# Patient Record
(demographics unavailable — no encounter records)

---

## 2017-03-14 NOTE — EKG REPORT
SEVERITY:- ABNORMAL ECG -

SINUS RHYTHM

CONSIDER LEFT VENTRICULAR HYPERTROPHY

:

Confirmed by: Brigida Zhang MD 14-Mar-2017 17:12:53

## 2019-02-13 NOTE — RADIOLOGY REPORT (SQ)
EXAM DESCRIPTION:  ANKLE RIGHT AP/LATERAL



COMPLETED DATE/TIME:  2/13/2019 3:35 pm



REASON FOR STUDY:  RT ANKLE SWELLING; ANKYLOSIS RT ANKLE M24.671  ANKYLOSIS, RIGHT ANKLE



COMPARISON:  None.



NUMBER OF VIEWS:  Two views.



TECHNIQUE:  AP and lateral radiographic images acquired of the right ankle.



LIMITATIONS:  None.



FINDINGS:  MINERALIZATION: Normal.

BONES: No acute fracture or dislocation.  No worrisome bone lesions.

JOINTS: There is a small joint effusion.

SOFT TISSUES: There is soft tissue swelling.

OTHER: No other significant finding.



IMPRESSION:  Small joint effusion.  Soft tissue swelling.  No fracture.



TECHNICAL DOCUMENTATION:  JOB ID:  [id number]

 2011 [employer]- All Rights Reserved



Reading location - IP/workstation name: [email or web address]

## 2019-04-04 NOTE — RADIOLOGY REPORT (SQ)
EXAM DESCRIPTION:  KNEE RIGHT 2 VIEWS



COMPLETED DATE/TIME:  4/4/2019 11:39 am



REASON FOR STUDY:  TRAUMATIC ARTHROPATHY, RIGHT KNEE Z00.00  ENCNTR FOR GENERAL ADULT MEDICAL EXAM W/
O ABNORMAL FI M12.561  TRAUMATIC ARTHROPATHY, RIGHT KNEE



COMPARISON:  None.



NUMBER OF VIEWS:  Two views.



TECHNIQUE:  AP and lateral radiographic images acquired of the right knee.



LIMITATIONS:  None.



FINDINGS:  MINERALIZATION: Normal.

BONES: No acute fracture or dislocation.  No worrisome bone lesions.

JOINT: No effusion.

SOFT TISSUES: No soft tissue swelling.  No radio-opaque foreign body.

OTHER: No other significant finding.



IMPRESSION:  NEGATIVE STUDY OF THE RIGHT KNEE. NO RADIOGRAPHIC EVIDENCE OF ACUTE INJURY.



TECHNICAL DOCUMENTATION:  JOB ID:  [id number]

 2011 [employer]- All Rights Reserved



Reading location - IP/workstation name: [email or web address]

## 2019-12-10 NOTE — ER DOCUMENT REPORT
ED Medical Screen (RME)





- General


Chief Complaint: Slurred Speech


Stated Complaint: SLURRED SPEECH


Time Seen by Provider: 12/10/19 16:23


Primary Care Provider: 


MARCO GRIMES [Primary Care Provider] - Follow up as needed


Mode of Arrival: Ambulatory


Information source: Patient


Notes: 





58-year-old female presented to ED for complaint of strokelike symptoms at her 

primary care's office.  She states she has been having these symptoms since 

Friday.  She states she has had some feeling like her tongue was swollen some 

slurring of her speech drooping of her face and she states she keeps falling 

since Friday.  She states she has no new symptoms since Friday.  She states she 

attempted to give plasma today so they took her off of it.  She then went to her

primary care doctor who noticed her symptoms and sent her to the emergency room.

 Patient is alert oriented she does have a little bit of slurring to her speech.

 Patient does have some droop to the left side of her face there is no palmar 

drift.  She states she has had some weakness to the left leg.  She does have 

some weight gain in the past month.














I have greeted and performed a rapid initial assessment of this patient.  A 

comprehensive ED assessment and evaluation of the patient, analysis of test 

results and completion of medical decision making process will be conducted by 

an additional ED providers.


TRAVEL OUTSIDE OF THE [place] IN LAST 30 DAYS: No





- Related Data


Allergies/Adverse Reactions: 


                                        





acetaminophen [From Percocet] Allergy (Verified 03/10/13 11:56)


   


ibuprofen [Ibuprofen] Allergy (Verified 03/10/13 11:56)


   


morphine [Morphine] Allergy (Verified 03/10/13 11:56)


   


oxycodone HCl [From Percocet] Allergy (Verified 03/10/13 11:56)


   


prochlorperazine edisylate [From Compazine] Allergy (Verified 03/10/13 11:55)


   


prochlorperazine maleate [From Compazine] Allergy (Verified 03/10/13 11:55)


   











Past Medical History





- Past Medical History


Cardiac Medical History: Reports: Hx Hypertension





- Immunizations


Immunizations up to date: Yes


Hx Diphtheria, Pertussis, Tetanus Vaccination: Yes





Doctor's Discharge





- Discharge


Referrals: 


MARCO GRIMES [Primary Care Provider] - Follow up as needed

## 2019-12-10 NOTE — RADIOLOGY REPORT (SQ)
EXAM DESCRIPTION:  CT HEAD WITHOUT



COMPLETED DATE/TIME:  12/10/2019 6:38 pm



REASON FOR STUDY:  Slurred speech, left weakness



COMPARISON:  CT brain 5/7/2010



TECHNIQUE:  Axial images acquired through the brain without intravenous contrast.  Images reviewed wi
th bone, brain and subdural windows.  Additional sagittal and coronal reconstructions were generated.
 Images stored on PACS.

All CT scanners at this facility use dose modulation, iterative reconstruction, and/or weight based d
osing when appropriate to reduce radiation dose to as low as reasonably achievable (ALARA).

[facility]: Dose Right  [facility]: CareDose    [facility]: Dose Right    [facility]: Teradose 4D    [facility]: [employer] Technologies



RADIATION DOSE:  CT Rad equipment meets quality standard of care and radiation dose reduction techniq
ues were employed. CTDIvol: 53.2 mGy. DLP: 991 mGy-cm. mGy.



LIMITATIONS:  None.



FINDINGS:  VENTRICLES: Normal size and contour.

CEREBRUM: There is focal low attenuation along the right posterior limb internal capsule/medial basal
 ganglia on axial images 16-19 and coronal image 32/60.  This could represent an early subacute infar
ct 1 week to 1-month-old.  No CT evidence of acute intracranial hemorrhage, mass effect, or midline s
hift.

CEREBELLUM: No masses.  No hemorrhage.  No alteration of density.  No evidence for acute infarction.

EXTRAAXIAL SPACES: No fluid collections.  No masses.

ORBITS AND GLOBE: No intra- or extraconal masses.  Normal contour of globe without masses.

CALVARIUM: No fracture.

PARANASAL SINUSES: No fluid or mucosal thickening.

SOFT TISSUES: No mass or hematoma.

OTHER: No other significant finding.



IMPRESSION:  Early subacute nonhemorrhagic infarct in the right posterior limb internal capsule/later
al basal ganglia.

EVIDENCE OF ACUTE STROKE: Yes



COMMENT:  Quality ID # [id number]: Final reports with documentation of one or more dose reduction techniques
 (e.g., Automated exposure control, adjustment of the mA and/or kV according to patient size, use of 
iterative reconstruction technique)



TECHNICAL DOCUMENTATION:  JOB ID:  [id number]

 2011 [employer]- All Rights Reserved



Reading location - IP/workstation name: [facility]

## 2019-12-10 NOTE — ER DOCUMENT REPORT
ED Neuro Symptoms/Deficit





- General


Chief Complaint: Slurred Speech


Stated Complaint: SLURRED SPEECH


Time Seen by Provider: 12/10/19 16:23


Primary Care Provider: 


[facility] [Primary Care Provider] - Follow up as needed


Mode of Arrival: Ambulatory


Information source: Patient, Relative


Notes: 





Hx. HTN. No DM. Non-smoker. No CAD. FHx. negative for CVA. Not taking ASA or 

Statin.


TRAVEL OUTSIDE OF THE [place] IN LAST 30 DAYS: No





- HPI


Patient complains to provider of: Difficulty walking, Facial Droop, Speech 

Impairment.  No: Falling, Paresthesia, Vision Changes


Onset: Other - 4 days ago


Awoke with symptoms: No


Exact time of onset: 8 PM Friday


Symptoms are: Constant


Duration: Continues in ED, More than 3 hrs


Quality of pain: No pain


Loss of consciousness: No loss of consciousness


Was STROKE ALERT Called: No


Baseline Cognitive: Alert, oriented X 3


Baseline Gait: Walks w/o assistance


New weakness: LLE, L facial


Decreased ability to stand/walk: Difficult


Impaired speech/swallowing: Difficult - minimal slurring and tongue feels 

swollen. Eating/drinking normally.


Vision problem/glaucoma: No


Associated symptoms: denies: Chest pain, Headache, Seizure


Similar symptoms previously: No


Recently seen / treated by doctor: Yes - sent to ED by [facility] 

provider





- Related Data


Allergies/Adverse Reactions: 


                                        





acetaminophen [From Percocet] Allergy (Verified 03/10/13 11:56)


   


ibuprofen [Ibuprofen] Allergy (Verified 03/10/13 11:56)


   


morphine [Morphine] Allergy (Verified 03/10/13 11:56)


   


oxycodone HCl [From Percocet] Allergy (Verified 03/10/13 11:56)


   


prochlorperazine edisylate [From Compazine] Allergy (Verified 03/10/13 11:55)


   


prochlorperazine maleate [From Compazine] Allergy (Verified 03/10/13 11:55)


   











Past Medical History





- General


Information source: Patient - On Amlodipine





- Social History


Smoking Status: Never Smoker


Frequency of alcohol use: None


Drug Abuse: None


Family History: Reviewed & Not Pertinent


Patient has suicidal ideation: No


Patient has homicidal ideation: No





- Past Medical History


Cardiac Medical History: Reports: Hx Hypertension





- Immunizations


Immunizations up to date: Yes


Hx Diphtheria, Pertussis, Tetanus Vaccination: Yes





Review of Systems





- Review of Systems


Notes: 





Constitutional: Negative for fever.


HENT: Negative for sore throat.


Eyes: Negative for visual changes.


Cardiovascular: Negative for chest pain.


Respiratory: Negative for shortness of breath.


Gastrointestinal: Negative for abdominal pain, vomiting or diarrhea.


Genitourinary: Negative for dysuria.


Musculoskeletal: Negative for back pain.


Skin: Negative for rash.


Neurological: As per HPI.





10 point ROS negative except as marked above and in HPI.








Physical Exam





- Vital signs


Vitals: 


                                        











Temp Pulse Resp BP Pulse Ox


 


 98.3 F   85   16   154/94 H  100 


 


 12/10/19 16:26  12/10/19 16:26  12/10/19 16:26  12/10/19 16:26  12/10/19 16:26














- Notes


Notes: 











GENERAL: Well-developed well-nourished appearing in no acute distress.





SKIN: Good turgor no rashes.





HEAD: Normocephalic atraumatic.





EYES: PERRLA.  Conjunctivae and sclerae clear.





EARS: CANALS AND TMS CLEAR.





NOSE: CLEAR.





MOUTH: Speech is minimally slurred.  Normal gag reflex.  Patient was eating and 

drinking when I entered the room and was having no difficulty with this.  Moist 

mucosa.  Good dentition.  No stridor or edema.  No drooling.





NECK: Supple.  No masses or thyromegaly.  No adenopathy.  Carotids 2+ without 

bruits.  No JVD.





Throat, clear.





BACK: Symmetrical without tenderness.





CHEST: Respirations unlabored.  Breath sounds clear and symmetrical.





HEART: Regular rhythm.  No murmur gallop or rub.





ABDOMEN: Soft nontender without masses, organomegaly or rebound.  Bowel sounds 

normally active.  No bruits.





GENITALIA: Deferred.





EXTREMITIES: No edema.  No calf tenderness.  Cap refill less than 1.5 seconds.  

Dorsalis pedis and posterior tibial pulses 3+ and symmetrical.





NEUROLOGICAL: GCS 15.  Alert and oriented x3.  Dragging left lower extremity 

when she tries to walk but is able to stand.  Speech is fluent but mildly slu

rred..  Minimal left facial ptosis involving lower face only with otherwise 

normal cranial nerves II through XII intact.  Sensation normal throughout.  

Motor strength is 4/5 left lower extremity with 5/5 in the other 3 extremities. 

Normal tone.





Course





- Re-evaluation


Re-evalutation: 





12/10/19 19:56


Patient has findings on CT scan consistent with a subacute infarct in the 

posterior limb of the right internal capsule.  There is no associated 

hemorrhage.  This appears to be subacute per radiologist and that is consistent 

with clinical history.





Patient is not a candidate for lytic or interventional therapy based on 

timeframe.





Findings are discussed with on-call hospitalist Dr. Weiland who will admit to 

the floor.  Findings and recommendations have been discussed with patient and 

her boyfriend at the bedside and they expressed understanding and agreement with

treatment recommendations..





- Vital Signs


Vital signs: 


                                        











Temp Pulse Resp BP Pulse Ox


 


 98.3 F   85   20   131/68 H  100 


 


 12/10/19 16:27  12/10/19 16:26  12/10/19 19:21  12/10/19 19:21  12/10/19 19:21














- Laboratory


Result Diagrams: 


                                 12/10/19 17:02





                                 12/10/19 17:02


Laboratory results interpreted by me: 


                                        











  12/10/19 12/10/19 12/10/19





  17:02 17:02 17:04


 


RDW  15.1 H  


 


Potassium   3.5 L 


 


Est GFR (MDRD) Non-Af   50 L 


 


Urine Blood    SMALL H














- Diagnostic Test


Radiology reviewed: Reports reviewed





Discharge





- Discharge


Clinical Impression: 


 Subacute ischemic CVA





Condition: Fair


Disposition: ADMITTED AS INPATIENT


Admitting Provider: Weiland (Hospitalist)


Unit Admitted: Medical Floor


Referrals: 


[facility] [[facility]] - Follow up as needed

## 2019-12-11 NOTE — LEFT AGAINST MEDICAL ADVICE
Against Medical Advice


Admission Date/Time: 12/10/19 20:08





 Primary Care Provider: [facility]








Date of Patient Emigration: 12/11/19





- Diagnosis:


(1) Dysarthria


Is this a current diagnosis for this admission?: Yes   





(2) Weakness of right lower extremity


Is this a current diagnosis for this admission?: Yes   





(3) Weakness on right side of face


Is this a current diagnosis for this admission?: Yes   





(4) Hypokalemia


Is this a current diagnosis for this admission?: Yes   





(5) Hypertension


Is this a current diagnosis for this admission?: Yes   





- Summary:


Summary: 


Please see Admission and Progress Notes as well.





ANGEL SCHMIDT is a 58 F, who LEFT AGAINST MEDICAL ADVICE.





Patient chose to leave AGAINST MEDICAL ADVICE due to being nonavailability of a 

private room.





The Patient was admitted on 12/10/19 20:08.

## 2019-12-11 NOTE — EKG REPORT
SEVERITY:- ABNORMAL ECG -

SINUS RHYTHM

PROBABLE LEFT ATRIAL ABNORMALITY

PROBABLE LVH WITH SECONDARY REPOL ABNRM

PROLONGED QT INTERVAL

:

Confirmed by: Nav Butt MD 11-Dec-2019 07:40:04

## 2019-12-11 NOTE — PDOC H&P
History of Present Illness


Admission Date/PCP: 


12/10/19 20:08


[facility]


Patient complains of: Left-sided weakness


History of Present Illness: 


ANGEL SCHMIDT is a 58 year old female who presents the emergency room with a 5-

day history of left-sided weakness.  She admits to the sudden onset of mild 

weakness in her left lower extremity associated with mild drooping of the left 

side of her face as well as minimal slurring of her speech "I feel like my 

tongue is thick", beginning 5 days ago and persisting since that time.  She 

denies other associated or accompanying signs and symptoms.  She denies prior 

similar episodes.  She has not identified any aggravating or ameliorating f

actors for her left-sided weakness.  In the emergency room she was found to have

an ischemic stroke involving the posterior horn of the right internal capsule.  

She was subsequently admitted to the hospital for further evaluation and 

treatment.





Past Medical History


Cardiac Medical History: Reports: Hypertension


   Denies: Coronary Artery Disease, Myocardial Infarction, Hyperlipidema


Pulmonary Medical History: 


   Denies: Asthma, Chronic Obstructive Pulmonary Disease (COPD)


EENT Medical History: 


   Denies: Cataracts, Ears - Hearing aids


Neurological Medical History: 


   Denies: Hemorrhagic CVA, Ischemic CVA, Seizures


Endocrine Medical History: 


   Denies: Diabetes Mellitus Type 1, Diabetes Mellitus Type 2, Hyperthyroidism, 

Hypothyroidism, Obesity


Renal/ Medical History: 


   Denies: Chronic Kidney Disease, Nephrolithiasis


Malignancy Medical History: Reports: None


GI Medical History: 


   Denies: Cirrhosis, Crohn's Disease, Hepatitis, Ulcerative Colitis


Musculoskeltal Medical History: 


   Denies: Arthritis, Gout


Skin Medical History: 


   Denies: Eczema, Psoriasis


Psychiatric Medical History: 


   Denies: Alcohol Dependency, Substance Abuse, Tobacco Dependency


Traumatic Medical History: Reports: None


Hematology: 


   Denies: Anemia, Bleeding Tendencies


Infectious Medical History: Reports: None





Past Surgical History


Past Surgical History: Reports: None





Social History


Information Source: Patient


Lives with: Alone


Smoking Status: Never Smoker


Electronic Cigarette use?: No


Frequency of Alcohol Use: None


Hx Recreational Drug Use: No


Drugs: None


Hx Prescription Drug Abuse: No





- Advance Directive


Resuscitation Status: Full Code


Surrogate healthcare decision maker:: 


Efe Frye





Family History


Family History: Hypertension.  denies: CAD, CVA, DM, Malignancy


Parental Family History Reviewed: Yes


Children Family History Reviewed: No


Sibling(s) Family History Reviewed.: Yes





Medication/Allergy


Home Medications: 








Amlodipine Besylate [Norvasc 5 mg Tablet] 5 mg PO DAILY 12/10/19 


Cyclobenzaprine HCl [Flexeril 10 mg Tablet] 10 mg PO Q8HP PRN 12/10/19 


Penicillin V Potassium [Penicillin Vk 500 mg Tablet] 250 mg PO QIDP PRN 12/10/19




Tramadol HCl [Ultram 50 mg Tablet] 50 mg PO Q6HP PRN 12/10/19 








Allergies/Adverse Reactions: 


                                        





acetaminophen [From Percocet] Allergy (Verified 03/10/13 11:56)


   


ibuprofen [Ibuprofen] Allergy (Verified 03/10/13 11:56)


   


morphine [Morphine] Allergy (Verified 03/10/13 11:56)


   


oxycodone HCl [From Percocet] Allergy (Verified 03/10/13 11:56)


   


prochlorperazine edisylate [From Compazine] Allergy (Verified 03/10/13 11:55)


   


prochlorperazine maleate [From Compazine] Allergy (Verified 03/10/13 11:55)


   











Review of Systems


Constitutional: PRESENT: as per HPI, weakness - Left lower extremity and left 

facial weakness.  ABSENT: chills, fever(s), headache(s)


Eyes: ABSENT: visual disturbances, other - Eye pain


Ears: ABSENT: hearing changes, other - Ear pain


Nose, Mouth, and Throat: ABSENT: headache(s), mouth pain, sore throat


Cardiovascular: ABSENT: chest pain, palpitations


Respiratory: ABSENT: cough, dyspnea


Gastrointestinal: ABSENT: abdominal pain, constipation, diarrhea, nausea, 

vomiting


Musculoskeletal: PRESENT: muscle weakness - Left lower extremity and left facial

weakness.  ABSENT: back pain, joint swelling


Integumentary: ABSENT: pruritus, rash


Neurological: PRESENT: abnormal speech - Mild slurring of speech, focal weakness

- Left lower extremity and left facial weakness.  ABSENT: abnormal gait, 

abnormal movements, confusion, convulsions, lack of coordination, memory loss, 

syncope


Psychiatric: ABSENT: anxiety, depression


Endocrine: ABSENT: cold intolerance, heat intolerance


Hematologic/Lymphatic: ABSENT: easy bleeding, easy bruising


Allergic/Immunologic: ABSENT: seasonal rhinorrhea





Physical Exam


Vital Signs: 


                                        











Temp Pulse Resp BP Pulse Ox


 


 98.3 F   85   20   131/68 H  100 


 


 12/10/19 16:27  12/10/19 16:26  12/10/19 19:21  12/10/19 19:21  12/10/19 19:21








                                 Intake & Output











 12/08/19 12/09/19 12/10/19





 23:59 23:59 23:59


 


Weight   63.9 kg











General appearance: PRESENT: no acute distress, cooperative


Head exam: PRESENT: atraumatic, normocephalic


Eye exam: PRESENT: conjunctiva pink, EOMI.  ABSENT: conjunctival injection, 

nystagmus, scleral icterus


Ear exam: PRESENT: normal external ear exam.  ABSENT: bleeding, drainage


Mouth exam: PRESENT: dry mucosa, neck supple


Neck exam: ABSENT: thyromegaly, tracheal deviation


Respiratory exam: PRESENT: clear to auscultation norberto, symmetrical, unlabored


Cardiovascular exam: PRESENT: RRR.  ABSENT: clicks, gallop, rubs


Pulses: PRESENT: normal radial pulses, normal dorsalis pedis pul


Vascular exam: PRESENT: normal capillary refill.  ABSENT: pallor


GI/Abdominal exam: PRESENT: normal bowel sounds, soft.  ABSENT: tenderness


Rectal exam: PRESENT: deferred


Extremities exam: ABSENT: joint swelling, pedal edema


Musculoskeletal exam: ABSENT: deformity, dislocation


Neurological exam: PRESENT: alert, oriented to person, oriented to place, 

oriented to time, oriented to situation, motor sensory deficit - Mild left lower

extremity weakness, mild left facial weakness in the muscles of facial 

expression, other - Minimal dysarthria


Psychiatric exam: PRESENT: appropriate affect, normal mood


Skin exam: PRESENT: dry, intact, warm.  ABSENT: jaundice, rash, urticaria





Results


Laboratory Results: 


                                        





                                 12/10/19 17:02 





                                 12/10/19 17:02 





                                        











  12/10/19 12/10/19 12/10/19





  17:02 17:02 17:04


 


WBC  6.6  


 


RBC  4.59  


 


Hgb  13.4  


 


Hct  40.5  


 


MCV  88  


 


MCH  29.3  


 


MCHC  33.1  


 


RDW  15.1 H  


 


Plt Count  355  


 


Seg Neutrophils %  64.6  


 


Sodium   141.4 


 


Potassium   3.5 L 


 


Chloride   104 


 


Carbon Dioxide   28 


 


Anion Gap   9 


 


BUN   15 


 


Creatinine   1.12 


 


Est GFR ( Amer)   > 60 


 


Glucose   95 


 


Calcium   10.2 


 


Total Bilirubin   0.2 


 


AST   26 


 


Alkaline Phosphatase   105 


 


Total Protein   6.9 


 


Albumin   3.8 


 


Lipase   98.5 


 


Urine Color    YELLOW


 


Urine Appearance    CLOUDY


 


Urine pH    9.0


 


Ur Specific Gravity    1.006


 


Urine Protein    NEGATIVE


 


Urine Glucose (UA)    NEGATIVE


 


Urine Ketones    NEGATIVE


 


Urine Blood    SMALL H


 


Urine RBC (Auto)    3











Impressions: 


                                        





Head CT  12/10/19 16:28


IMPRESSION:  Early subacute nonhemorrhagic infarct in the right posterior limb 

internal capsule/lateral basal ganglia.


EVIDENCE OF ACUTE STROKE: Yes


 














Assessment and Plan





- Diagnosis


(1) Dysarthria


Is this a current diagnosis for this admission?: Yes   





(2) Weakness of right lower extremity


Is this a current diagnosis for this admission?: Yes   





(3) Weakness on right side of face


Is this a current diagnosis for this admission?: Yes   





(4) Hypokalemia


Is this a current diagnosis for this admission?: Yes   





(5) Hypertension


Qualifiers: 


   Hypertension type: essential hypertension   Qualified Code(s): I10 - 

Essential (primary) hypertension   


Is this a current diagnosis for this admission?: Yes   





- Plan Summary


Summary: 


Patient will be admitted to the stroke protocol and receive usual supportive and

symptomatic cares.  Her blood pressure was monitored closely with frequent vital

signs as well her neurologic signs and symptoms.  Her hypokalemia will be 

replaced with oral therapy.  Physical therapy, occupational therapy and speech 

therapy consultations will be obtained.  An MRI and MRA of the brain will be 

obtained as well an MRI of the carotid arteries.  An echocardiogram will also be

obtained.  A CBC and metabolic profile will be obtained as needed as part of her

ongoing evaluation.  A magnesium level will be obtained in the morning.  A lipid

profile and TSH will also be obtained.  Patient will be started on an 

antiplatelet agent and a statin per protocol.





- Time


Time Spent with patient: 15-24 minutes


Medications reviewed and adjusted accordingly: Yes


Anticipated discharge: Home with Homehealth





- Inpatient Certification


Based on my medical assessment, after consideration of the patient's 

comorbidities, presenting symptoms, or acuity I expect that the services needed 

warrant INPATIENT care.: Yes


I certify that my determination is in accordance with my understanding of 

Medicare's requirements for reasonable and necessary INPATIENT services [42 CFR 

412.3e].: Yes


Medical Necessity: Need Close Monitoring Due to Risk of Patient Decompensation, 

Need for Neurological Checks, Risk of Complication if Not Cared For in Hospital